# Patient Record
Sex: FEMALE | Race: WHITE | URBAN - NONMETROPOLITAN AREA
[De-identification: names, ages, dates, MRNs, and addresses within clinical notes are randomized per-mention and may not be internally consistent; named-entity substitution may affect disease eponyms.]

---

## 2018-09-02 LAB
INFLUENZA A ANTIGEN, POC: NEGATIVE
INFLUENZA B ANTIGEN, POC: NEGATIVE
RAPID GROUP A STREP (OHS): POSITIVE

## 2021-08-20 ENCOUNTER — IMMUNIZATION (OUTPATIENT)
Dept: OBSTETRICS AND GYNECOLOGY | Facility: CLINIC | Age: 26
End: 2021-08-20
Payer: OTHER GOVERNMENT

## 2021-08-20 DIAGNOSIS — Z23 NEED FOR VACCINATION: Primary | ICD-10-CM

## 2021-08-20 PROCEDURE — 91300 COVID-19, MRNA, LNP-S, PF, 30 MCG/0.3 ML DOSE VACCINE: ICD-10-PCS | Mod: ,,, | Performed by: ANESTHESIOLOGY

## 2021-08-20 PROCEDURE — 91300 COVID-19, MRNA, LNP-S, PF, 30 MCG/0.3 ML DOSE VACCINE: CPT | Mod: ,,, | Performed by: ANESTHESIOLOGY

## 2021-08-20 PROCEDURE — 0001A COVID-19, MRNA, LNP-S, PF, 30 MCG/0.3 ML DOSE VACCINE: CPT | Mod: CV19,,, | Performed by: ANESTHESIOLOGY

## 2021-08-20 PROCEDURE — 0001A COVID-19, MRNA, LNP-S, PF, 30 MCG/0.3 ML DOSE VACCINE: ICD-10-PCS | Mod: CV19,,, | Performed by: ANESTHESIOLOGY

## 2021-09-10 ENCOUNTER — IMMUNIZATION (OUTPATIENT)
Dept: OBSTETRICS AND GYNECOLOGY | Facility: CLINIC | Age: 26
End: 2021-09-10
Payer: OTHER GOVERNMENT

## 2021-09-10 DIAGNOSIS — Z23 NEED FOR VACCINATION: Primary | ICD-10-CM

## 2021-09-10 PROCEDURE — 0002A COVID-19, MRNA, LNP-S, PF, 30 MCG/0.3 ML DOSE VACCINE: ICD-10-PCS | Mod: CV19,,, | Performed by: ANESTHESIOLOGY

## 2021-09-10 PROCEDURE — 91300 COVID-19, MRNA, LNP-S, PF, 30 MCG/0.3 ML DOSE VACCINE: CPT | Mod: ,,, | Performed by: ANESTHESIOLOGY

## 2021-09-10 PROCEDURE — 91300 COVID-19, MRNA, LNP-S, PF, 30 MCG/0.3 ML DOSE VACCINE: ICD-10-PCS | Mod: ,,, | Performed by: ANESTHESIOLOGY

## 2021-09-10 PROCEDURE — 0002A COVID-19, MRNA, LNP-S, PF, 30 MCG/0.3 ML DOSE VACCINE: CPT | Mod: CV19,,, | Performed by: ANESTHESIOLOGY

## 2022-04-09 ENCOUNTER — HISTORICAL (OUTPATIENT)
Dept: ADMINISTRATIVE | Facility: HOSPITAL | Age: 27
End: 2022-04-09

## 2022-04-27 VITALS
WEIGHT: 123.69 LBS | HEIGHT: 63 IN | BODY MASS INDEX: 21.91 KG/M2 | OXYGEN SATURATION: 98 % | SYSTOLIC BLOOD PRESSURE: 98 MMHG | DIASTOLIC BLOOD PRESSURE: 68 MMHG

## 2022-09-15 ENCOUNTER — HISTORICAL (OUTPATIENT)
Dept: ADMINISTRATIVE | Facility: HOSPITAL | Age: 27
End: 2022-09-15

## 2024-12-21 ENCOUNTER — HOSPITAL ENCOUNTER (EMERGENCY)
Facility: HOSPITAL | Age: 29
Discharge: HOME | End: 2024-12-22
Attending: EMERGENCY MEDICINE
Payer: COMMERCIAL

## 2024-12-21 DIAGNOSIS — J06.9 VIRAL URI WITH COUGH: Primary | ICD-10-CM

## 2024-12-21 PROCEDURE — 99281 EMR DPT VST MAYX REQ PHY/QHP: CPT

## 2024-12-22 VITALS
OXYGEN SATURATION: 96 % | SYSTOLIC BLOOD PRESSURE: 133 MMHG | BODY MASS INDEX: 28.29 KG/M2 | HEIGHT: 63 IN | DIASTOLIC BLOOD PRESSURE: 61 MMHG | TEMPERATURE: 98 F | HEART RATE: 84 BPM | RESPIRATION RATE: 18 BRPM | WEIGHT: 159.63 LBS

## 2024-12-22 NOTE — ED PROVIDER NOTES
"[facility] - EMERGENCY DEPARTMENT  EMERGENCY DEPARTMENT ENCOUNTER    Pt Name:  Johanna Hawley  MRN:  [id number]  YOB: 1995  Date of evaluation: 12/21/2024  Provider: Adonis Malhotra MD      CHIEF COMPLAINT:     Chief Complaint   Patient presents with    Cough     Patient reports congested cough, "feeling like she can't catch her breath," and intermittent fever since Tuesday. Went to Urgent Care "and tested negative for everything."        HPI history provided by the patient.    HISTORY IF PRESENT ILLNESS:  (location/symptom, timing/onset, context/setting, quality, duration, modifying factors, severity)   Note limiting factors.     Johanna Hawley is a 29 y.o. female who presents to the emergency department for body aches, fever and a cough.  Sxs began about 4 days ago.  She went to  3 nights and tested for "everything" which was neg for everything.  Last fever was a few hrs ago at 100.8.  Pt took Mucinex severe cough.  She has had APAP.        Nursing notes were reviewed    REVIEW OF SYSTEMS:     Review of Systems   Constitutional:  Positive for fatigue and fever.   HENT:  Positive for congestion and sinus pain. Negative for sore throat.    Respiratory:  Positive for cough.    Gastrointestinal:  Negative for nausea and vomiting.       PAST MEDICAL HISTORY:     History reviewed. No pertinent past medical history.    SURGICAL HISTORY:     History reviewed. No pertinent surgical history.    CURRENT MEDICATIONS:     Previous Medications    No medications on file       ALLERGIES:     Patient has no known allergies.    SOCIAL HISTORY:     Social History     Socioeconomic History    Marital status: Single   Tobacco Use    Smoking status: Never    Smokeless tobacco: Never   Substance and Sexual Activity    Alcohol use: Not Currently    Drug use: Never       SCREENINGS:           PHYSICAL EXAM:     ED Triage Vitals [12/21/24 2237]   /62   Pulse 94   Resp 18   Temp 98 °F (36.7 °C)   SpO2 (!) 94 %    " "    Physical Exam  Vitals and nursing note reviewed.   Constitutional:       Appearance: Normal appearance. She is not ill-appearing.   HENT:      Head: Normocephalic and atraumatic.      Mouth/Throat:      Mouth: Mucous membranes are moist.      Pharynx: Oropharynx is clear. No oropharyngeal exudate or posterior oropharyngeal erythema.   Eyes:      Extraocular Movements: Extraocular movements intact.      Pupils: Pupils are equal, round, and reactive to light.   Cardiovascular:      Rate and Rhythm: Normal rate and regular rhythm.      Heart sounds: No murmur heard.  Pulmonary:      Effort: Pulmonary effort is normal.      Breath sounds: Normal breath sounds. No wheezing, rhonchi or rales.   Skin:     General: Skin is warm and dry.      Capillary Refill: Capillary refill takes less than 2 seconds.   Neurological:      General: No focal deficit present.      Mental Status: She is alert and oriented to person, place, and time.      Cranial Nerves: No cranial nerve deficit.   Psychiatric:         Mood and Affect: Mood normal.         DIAGNOSTIC RESULTS:           RADIOLOGY:  Non plain film images such as CT, ultrasound and MRI are read by the radiologist.  Plain radiographic images were visualized and preliminarily interpreted by the emergency physician below findings:        No orders to display           LABS:  Labs Reviewed - No data to display    All other labs were within normal range her not returned as of this dictation.    EMERGENCY DEPARTMENT COURSE IN DIFFERENTIAL DIAGNOSIS/MDM:     Vitals:   Vitals:    12/21/24 2237 12/21/24 2240   BP: 119/62    BP Location: Left arm    Pulse: 94    Resp: 18    Temp: 98 °F (36.7 °C)    TempSrc: Oral    SpO2: (!) 94% 96%   Weight: 72.4 kg (159 lb 9.6 oz)    Height: 5' 3" (1.6 m)         Medical Decision Making  Patient presented to the ED complaining of congestion and cough.  This has been going on for about a week at this point.  She has tried Mucinex and Robitussin with " minimal relief.  She is 31 weeks Ob making this much more difficult to try and treat her.  Patient is limited to what she can take and I explained this to her.  Lung sounds are clear and I do not believe that she has a pneumonia.  SpO2 is 96% and greater on room air.  I discussed with her about the risk V.  benefit  of an x-ray.  We decided that an x-ray would probably not be in her best interest at this point without abnormal lung sounds or hypoxemia.  After careful review of history, physical, and supporting data, there is very low suspicion for a life-threatening or limb-threatening cause of the patient's symptoms.  The patient's symptoms have improved from presentation and appears clinically well.  Patient was reexamined prior to discharge and appears to be clinically improved.  Patient has been encouraged to follow up with his/her PCP and to return to the ED with any lack of improvement or worsening symptoms.  The patient's questions regarding the workup and plan were invited and answered.  The patient/guardian states understanding and agreement with the discharge planning.             Reassessment:          Medications - No data to display    CONSULTS:  None  Unless otherwise noted below, none.    Procedures      Additional MDM:   Differential Diagnosis:   Differential diagnoses include, but not limited to PE, pneumonia, upper respiratory infection, COVID, flu           FINAL IMPRESSION:     1. Viral URI with cough         DISPOSITION/PLAN:      ED Disposition Condition    Discharge Stable            PATIENT REFERRED TO:    [place] [address] [address]  [phone]    Call in 3 days  As needed, For follow up      DISCHARGE MEDICATIONS:  New Prescriptions    No medications on file           (Please note that portions of this chart were completed with the voice recognition program.  Efforts were made to edit the dictations but occasionally words are  mis-transcribed.)    Adonis Malhotra MD (electronically signed) Emergency Physician                                 Adonis Malhotra MD  12/21/24 [id number]